# Patient Record
Sex: MALE | Race: WHITE | Employment: FULL TIME | URBAN - METROPOLITAN AREA
[De-identification: names, ages, dates, MRNs, and addresses within clinical notes are randomized per-mention and may not be internally consistent; named-entity substitution may affect disease eponyms.]

---

## 2017-04-20 ENCOUNTER — TELEPHONE (OUTPATIENT)
Dept: INTERNAL MEDICINE CLINIC | Age: 62
End: 2017-04-20

## 2017-05-16 ENCOUNTER — OFFICE VISIT (OUTPATIENT)
Dept: INTERNAL MEDICINE CLINIC | Age: 62
End: 2017-05-16
Payer: COMMERCIAL

## 2017-05-16 VITALS
SYSTOLIC BLOOD PRESSURE: 118 MMHG | HEIGHT: 71 IN | BODY MASS INDEX: 25.9 KG/M2 | DIASTOLIC BLOOD PRESSURE: 64 MMHG | WEIGHT: 185 LBS

## 2017-05-16 DIAGNOSIS — M05.79 RHEUMATOID ARTHRITIS INVOLVING MULTIPLE SITES WITH POSITIVE RHEUMATOID FACTOR (HCC): ICD-10-CM

## 2017-05-16 DIAGNOSIS — M10.9 GOUTY ARTHRITIS: ICD-10-CM

## 2017-05-16 DIAGNOSIS — I10 ESSENTIAL HYPERTENSION: ICD-10-CM

## 2017-05-16 DIAGNOSIS — I50.42 CHRONIC COMBINED SYSTOLIC AND DIASTOLIC HEART FAILURE (HCC): ICD-10-CM

## 2017-05-16 DIAGNOSIS — Z12.11 SCREEN FOR COLON CANCER: Primary | ICD-10-CM

## 2017-05-16 DIAGNOSIS — N18.6 END STAGE RENAL DISEASE (HCC): ICD-10-CM

## 2017-05-16 PROCEDURE — 99213 OFFICE O/P EST LOW 20 MIN: CPT | Performed by: INTERNAL MEDICINE

## 2017-05-16 RX ORDER — CINACALCET 30 MG/1
30 TABLET, FILM COATED ORAL
COMMUNITY

## 2017-05-16 RX ORDER — FOLIC ACID 1 MG/1
1 TABLET ORAL
COMMUNITY

## 2017-05-19 DIAGNOSIS — Z12.11 SCREEN FOR COLON CANCER: ICD-10-CM

## 2017-05-19 LAB
CONTROL: PRESENT
HEMOCCULT STL QL: NEGATIVE

## 2017-05-19 PROCEDURE — 82274 ASSAY TEST FOR BLOOD FECAL: CPT | Performed by: INTERNAL MEDICINE

## 2017-11-07 DIAGNOSIS — M10.9 GOUT, UNSPECIFIED CAUSE, UNSPECIFIED CHRONICITY, UNSPECIFIED SITE: ICD-10-CM

## 2017-11-07 RX ORDER — ALLOPURINOL 100 MG/1
TABLET ORAL
Qty: 90 TABLET | Refills: 3 | Status: SHIPPED | OUTPATIENT
Start: 2017-11-07 | End: 2018-12-05 | Stop reason: SDUPTHER

## 2017-11-14 ENCOUNTER — OFFICE VISIT (OUTPATIENT)
Dept: INTERNAL MEDICINE CLINIC | Age: 62
End: 2017-11-14
Payer: COMMERCIAL

## 2017-11-14 VITALS
SYSTOLIC BLOOD PRESSURE: 130 MMHG | HEIGHT: 71 IN | DIASTOLIC BLOOD PRESSURE: 64 MMHG | BODY MASS INDEX: 25.76 KG/M2 | WEIGHT: 184 LBS

## 2017-11-14 DIAGNOSIS — I70.1 STENOSIS OF RIGHT RENAL ARTERY (HCC): ICD-10-CM

## 2017-11-14 DIAGNOSIS — I10 ESSENTIAL HYPERTENSION: ICD-10-CM

## 2017-11-14 DIAGNOSIS — N18.6 END STAGE RENAL DISEASE (HCC): Primary | ICD-10-CM

## 2017-11-14 DIAGNOSIS — M10.9 GOUTY ARTHRITIS: ICD-10-CM

## 2017-11-14 PROCEDURE — 99213 OFFICE O/P EST LOW 20 MIN: CPT | Performed by: INTERNAL MEDICINE

## 2017-11-14 ASSESSMENT — PATIENT HEALTH QUESTIONNAIRE - PHQ9
SUM OF ALL RESPONSES TO PHQ QUESTIONS 1-9: 0
2. FEELING DOWN, DEPRESSED OR HOPELESS: 0
1. LITTLE INTEREST OR PLEASURE IN DOING THINGS: 0
SUM OF ALL RESPONSES TO PHQ9 QUESTIONS 1 & 2: 0

## 2017-11-14 NOTE — PROGRESS NOTES
Flu vaccine  Completed       HPI   Chief Complaint   Patient presents with    Hypertension     The patient reports HTN been well controlled on current medication. No complaints of dizziness, shortness of breath or chest pain. The patient does not check his BP at home on coreg       ESRD on HD doing well           Review of Systems    Past Medical History:   Diagnosis Date    Anemia in chronic kidney disease(285.21)     Atherosclerosis of renal artery (HCC)     Chronic combined systolic and diastolic heart failure (HCC)     Coronary atherosclerosis of native coronary artery     Diseases of tricuspid valve     End stage renal disease (HCC)     Gouty arthritis     Left ventricular dysfunction 8/14/2014    LVEF 36%    Mitral valve insufficiency and aortic valve insufficiency     Other and unspecified coagulation defects     Other left bundle branch block     Other primary cardiomyopathies     Psoriasis     Renal sclerosis, unspecified     Retinal hemorrhage     Rheumatoid arthritis ([address]     Stenosis of right renal artery ([address] 8/11/2014    Unspecified essential hypertension     Unspecified hypertensive kidney disease with chronic kidney disease stage V or end stage renal disease(403.91)      Social History   Substance Use Topics    Smoking status: Never Smoker    Smokeless tobacco: Never Used    Alcohol use No       ROS  CVS no chest pain no sob no orthopnea  Resp no cough   General no weight loss no fatigue no fever      Objective:   Physical Exam     Blood pressure 130/64, height 5' 10.87\" (1.8 m), weight 184 lb (83.5 kg). General Appearance NAD conversant  Neck FROM supple no JVD  Lungs normal effort Clear to auscultation  Cardio regular rhythm no murmur  Abdomen Soft nontender no HSM  Ext no edema no cyanosis no clubbing   Skin no rashes no ulcers multiple tophi   Neuro no focal deficits   Musculoskeletal no spinal tenderness no kyphosis       Assessment:      1.  End stage renal disease ([facility]     2. Gouty arthritis     3. Stenosis of right renal artery (HCC)     4.  Essential hypertension           Plan:    ESRD on HD    BP controlled    gout stable on present dose of allopurinol HD pt    cont statin ASA coreg

## 2018-05-17 ENCOUNTER — OFFICE VISIT (OUTPATIENT)
Dept: INTERNAL MEDICINE CLINIC | Age: 63
End: 2018-05-17
Payer: MEDICARE

## 2018-05-17 VITALS
SYSTOLIC BLOOD PRESSURE: 108 MMHG | DIASTOLIC BLOOD PRESSURE: 62 MMHG | BODY MASS INDEX: 26.6 KG/M2 | HEIGHT: 71 IN | WEIGHT: 190 LBS

## 2018-05-17 DIAGNOSIS — I10 ESSENTIAL HYPERTENSION: ICD-10-CM

## 2018-05-17 DIAGNOSIS — N18.6 END STAGE RENAL DISEASE (HCC): Primary | ICD-10-CM

## 2018-05-17 DIAGNOSIS — M10.9 GOUTY ARTHRITIS: ICD-10-CM

## 2018-05-17 DIAGNOSIS — I50.42 CHRONIC COMBINED SYSTOLIC AND DIASTOLIC HEART FAILURE (HCC): ICD-10-CM

## 2018-05-17 PROCEDURE — G8419 CALC BMI OUT NRM PARAM NOF/U: HCPCS | Performed by: INTERNAL MEDICINE

## 2018-05-17 PROCEDURE — G8598 ASA/ANTIPLAT THER USED: HCPCS | Performed by: INTERNAL MEDICINE

## 2018-05-17 PROCEDURE — 4040F PNEUMOC VAC/ADMIN/RCVD: CPT | Performed by: INTERNAL MEDICINE

## 2018-05-17 PROCEDURE — 99213 OFFICE O/P EST LOW 20 MIN: CPT | Performed by: INTERNAL MEDICINE

## 2018-05-17 PROCEDURE — 1036F TOBACCO NON-USER: CPT | Performed by: INTERNAL MEDICINE

## 2018-05-17 PROCEDURE — G8427 DOCREV CUR MEDS BY ELIG CLIN: HCPCS | Performed by: INTERNAL MEDICINE

## 2018-05-17 PROCEDURE — 3017F COLORECTAL CA SCREEN DOC REV: CPT | Performed by: INTERNAL MEDICINE

## 2018-05-17 PROCEDURE — 1123F ACP DISCUSS/DSCN MKR DOCD: CPT | Performed by: INTERNAL MEDICINE

## 2018-05-17 RX ORDER — PREDNISONE 20 MG/1
20 TABLET ORAL DAILY
Qty: 7 TABLET | Refills: 0 | Status: SHIPPED | OUTPATIENT
Start: 2018-05-17 | End: 2018-05-24

## 2018-05-17 ASSESSMENT — PATIENT HEALTH QUESTIONNAIRE - PHQ9
SUM OF ALL RESPONSES TO PHQ QUESTIONS 1-9: 0
1. LITTLE INTEREST OR PLEASURE IN DOING THINGS: 0
SUM OF ALL RESPONSES TO PHQ9 QUESTIONS 1 & 2: 0
2. FEELING DOWN, DEPRESSED OR HOPELESS: 0

## 2018-05-18 LAB — URIC ACID: 2.6

## 2018-05-24 DIAGNOSIS — M10.9 GOUTY ARTHRITIS: ICD-10-CM

## 2018-12-05 DIAGNOSIS — M10.9 GOUT, UNSPECIFIED CAUSE, UNSPECIFIED CHRONICITY, UNSPECIFIED SITE: ICD-10-CM

## 2018-12-05 RX ORDER — ALLOPURINOL 100 MG/1
TABLET ORAL
Qty: 90 TABLET | Refills: 3 | Status: SHIPPED | OUTPATIENT
Start: 2018-12-05

## 2019-01-24 ENCOUNTER — OFFICE VISIT (OUTPATIENT)
Dept: INTERNAL MEDICINE CLINIC | Age: 64
End: 2019-01-24
Payer: MEDICARE

## 2019-01-24 VITALS
BODY MASS INDEX: 26.88 KG/M2 | WEIGHT: 192 LBS | DIASTOLIC BLOOD PRESSURE: 68 MMHG | SYSTOLIC BLOOD PRESSURE: 110 MMHG | HEIGHT: 71 IN

## 2019-01-24 DIAGNOSIS — Z12.11 COLON CANCER SCREENING: Primary | ICD-10-CM

## 2019-01-24 DIAGNOSIS — I70.1 ATHEROSCLEROSIS OF RENAL ARTERY (HCC): ICD-10-CM

## 2019-01-24 DIAGNOSIS — I70.1 STENOSIS OF RIGHT RENAL ARTERY (HCC): ICD-10-CM

## 2019-01-24 DIAGNOSIS — N18.6 END STAGE RENAL DISEASE (HCC): ICD-10-CM

## 2019-01-24 DIAGNOSIS — I50.42 CHRONIC COMBINED SYSTOLIC AND DIASTOLIC HEART FAILURE (HCC): ICD-10-CM

## 2019-01-24 LAB
BASOPHILS ABSOLUTE: ABNORMAL /ΜL
BASOPHILS RELATIVE PERCENT: ABNORMAL %
EOSINOPHILS ABSOLUTE: ABNORMAL /ΜL
EOSINOPHILS RELATIVE PERCENT: ABNORMAL %
HCT VFR BLD CALC: 53.5 % (ref 41–53)
HEMOGLOBIN: 17.1 G/DL (ref 13.5–17.5)
LYMPHOCYTES ABSOLUTE: ABNORMAL /ΜL
LYMPHOCYTES RELATIVE PERCENT: ABNORMAL %
MCH RBC QN AUTO: 32.3 PG
MCHC RBC AUTO-ENTMCNC: 32 G/DL
MCV RBC AUTO: 100.9 FL
MONOCYTES ABSOLUTE: ABNORMAL /ΜL
MONOCYTES RELATIVE PERCENT: ABNORMAL %
NEUTROPHILS ABSOLUTE: ABNORMAL /ΜL
NEUTROPHILS RELATIVE PERCENT: ABNORMAL %
PDW BLD-RTO: ABNORMAL %
PLATELET # BLD: 182 K/ΜL
PMV BLD AUTO: ABNORMAL FL
RBC # BLD: 5.3 10^6/ΜL
WBC # BLD: 7.38 10^3/ML

## 2019-01-24 PROCEDURE — G8427 DOCREV CUR MEDS BY ELIG CLIN: HCPCS | Performed by: INTERNAL MEDICINE

## 2019-01-24 PROCEDURE — 3017F COLORECTAL CA SCREEN DOC REV: CPT | Performed by: INTERNAL MEDICINE

## 2019-01-24 PROCEDURE — G8598 ASA/ANTIPLAT THER USED: HCPCS | Performed by: INTERNAL MEDICINE

## 2019-01-24 PROCEDURE — 1123F ACP DISCUSS/DSCN MKR DOCD: CPT | Performed by: INTERNAL MEDICINE

## 2019-01-24 PROCEDURE — 99213 OFFICE O/P EST LOW 20 MIN: CPT | Performed by: INTERNAL MEDICINE

## 2019-01-24 PROCEDURE — 4040F PNEUMOC VAC/ADMIN/RCVD: CPT | Performed by: INTERNAL MEDICINE

## 2019-01-24 PROCEDURE — G8482 FLU IMMUNIZE ORDER/ADMIN: HCPCS | Performed by: INTERNAL MEDICINE

## 2019-01-24 PROCEDURE — G8419 CALC BMI OUT NRM PARAM NOF/U: HCPCS | Performed by: INTERNAL MEDICINE

## 2019-01-24 PROCEDURE — 1036F TOBACCO NON-USER: CPT | Performed by: INTERNAL MEDICINE

## 2019-01-24 PROCEDURE — 1101F PT FALLS ASSESS-DOCD LE1/YR: CPT | Performed by: INTERNAL MEDICINE

## 2019-01-24 RX ORDER — MIDODRINE HYDROCHLORIDE 10 MG/1
TABLET ORAL
COMMUNITY
Start: 2019-01-05

## 2019-01-24 RX ORDER — AMMONIUM LACTATE 12 G/100G
CREAM TOPICAL
Refills: 2 | COMMUNITY
Start: 2018-12-28 | End: 2019-01-24 | Stop reason: ALTCHOICE

## 2019-01-24 RX ORDER — METOPROLOL SUCCINATE 25 MG/1
TABLET, EXTENDED RELEASE ORAL
Refills: 3 | COMMUNITY
Start: 2018-11-27 | End: 2019-06-13 | Stop reason: SDUPTHER

## 2019-01-24 RX ORDER — CHOLECALCIFEROL (VITAMIN D3) 10 MCG
TABLET ORAL
COMMUNITY

## 2019-01-24 RX ORDER — CLOPIDOGREL BISULFATE 75 MG/1
75 TABLET ORAL
COMMUNITY

## 2019-01-24 RX ORDER — HALOBETASOL PROPIONATE 0.05 %
OINTMENT (GRAM) TOPICAL
Refills: 1 | COMMUNITY
Start: 2018-12-10 | End: 2019-01-24 | Stop reason: ALTCHOICE

## 2019-01-24 ASSESSMENT — PATIENT HEALTH QUESTIONNAIRE - PHQ9
SUM OF ALL RESPONSES TO PHQ9 QUESTIONS 1 & 2: 0
2. FEELING DOWN, DEPRESSED OR HOPELESS: 0
SUM OF ALL RESPONSES TO PHQ QUESTIONS 1-9: 0
SUM OF ALL RESPONSES TO PHQ QUESTIONS 1-9: 0
1. LITTLE INTEREST OR PLEASURE IN DOING THINGS: 0

## 2019-04-29 DIAGNOSIS — Z12.11 COLON CANCER SCREENING: ICD-10-CM

## 2019-04-30 DIAGNOSIS — R19.5 POSITIVE COLORECTAL CANCER SCREENING USING COLOGUARD TEST: Primary | ICD-10-CM

## 2019-05-30 ENCOUNTER — OFFICE VISIT (OUTPATIENT)
Dept: INTERNAL MEDICINE CLINIC | Age: 64
End: 2019-05-30
Payer: MEDICARE

## 2019-05-30 VITALS — BODY MASS INDEX: 28.14 KG/M2 | HEIGHT: 71 IN | WEIGHT: 201 LBS

## 2019-05-30 DIAGNOSIS — I10 ESSENTIAL HYPERTENSION: Primary | ICD-10-CM

## 2019-05-30 DIAGNOSIS — N18.6 END STAGE RENAL DISEASE (HCC): ICD-10-CM

## 2019-05-30 PROCEDURE — 1123F ACP DISCUSS/DSCN MKR DOCD: CPT | Performed by: INTERNAL MEDICINE

## 2019-05-30 PROCEDURE — 3017F COLORECTAL CA SCREEN DOC REV: CPT | Performed by: INTERNAL MEDICINE

## 2019-05-30 PROCEDURE — 99213 OFFICE O/P EST LOW 20 MIN: CPT | Performed by: INTERNAL MEDICINE

## 2019-05-30 PROCEDURE — G8419 CALC BMI OUT NRM PARAM NOF/U: HCPCS | Performed by: INTERNAL MEDICINE

## 2019-05-30 PROCEDURE — G8598 ASA/ANTIPLAT THER USED: HCPCS | Performed by: INTERNAL MEDICINE

## 2019-05-30 PROCEDURE — 4040F PNEUMOC VAC/ADMIN/RCVD: CPT | Performed by: INTERNAL MEDICINE

## 2019-05-30 PROCEDURE — G8428 CUR MEDS NOT DOCUMENT: HCPCS | Performed by: INTERNAL MEDICINE

## 2019-05-30 PROCEDURE — 1036F TOBACCO NON-USER: CPT | Performed by: INTERNAL MEDICINE

## 2019-05-30 NOTE — PROGRESS NOTES
Subjective:      Patient ID: Mallory Meng is a 75 y.o. male. Chronic Disease Visit Information    BP Readings from Last 3 Encounters:   01/24/19 110/68   05/17/18 108/62   11/14/17 130/64          Hemoglobin A1C (%)   Date Value   06/10/2016 5.4     LDL Cholesterol (mg/dL)   Date Value   04/12/2018 111     LDL Calculated (mg/dL)   Date Value   06/10/2016 34     HDL (mg/dL)   Date Value   04/12/2018 37     BUN (mg/dL)   Date Value   04/12/2018 41 (H)     CREATININE (mg/dL)   Date Value   04/12/2018 7.53 (H)     Glucose (mg/dL)   Date Value   04/12/2018 84            Have you changed or started any medications since your last visit including any over-the-counter medicines, vitamins, or herbal medicines? no   Are you having any side effects from any of your medications? -  no  Have you stopped taking any of your medications? Is so, why? -  no    Have you seen any other physician or provider since your last visit? No  Have you had any other diagnostic tests since your last visit? Yes - Records Obtained  Have you been seen in the emergency room and/or had an admission to a hospital since we last saw you? Yes - Records Obtained  Have you had your annual diabetic retinal (eye) exam? No  Have you had your routine dental cleaning in the past 6 months? no    Have you activated your [employer] account? If not, what are your barriers? Yes     Patient Care Team:  Jyotsna Moreno MD as PCP - General (Internal Medicine)  Jyotsna Moreno MD as PCP - [facility]  Vianey Peck MD as Consulting Physician (Cardiology)  Saundra Anderson MD as Consulting Physician (Nephrology)     Chief Complaint   Patient presents with    Hypertension    Other     pt states can not take BP. Has fistulas in both arms at the moment.               Medical History Review  Past Medical, Family, and Social History reviewed and does contribute to the patient presenting condition    Health Maintenance   Topic Date Due    Hepatitis B Vaccine (1 of 3 - Risk Recombivax 3-dose series) 02/21/1972    Annual Wellness Visit (AWV)  02/21/2016    Pneumococcal 65+ years Vaccine (2 of 2 - PPSV23) 02/21/2018    Potassium monitoring  04/12/2019    Creatinine monitoring  04/12/2019    DTaP/Tdap/Td vaccine (1 - Tdap) 01/21/2020 (Originally 2/21/1972)    Shingles Vaccine (1 of 2) 01/24/2020 (Originally 2/21/2003)    Colon cancer screen fecal DNA test (Cologuard)  04/09/2022    Lipid screen  04/12/2023    Flu vaccine  Completed    Hepatitis C screen  Completed    HPV vaccine  Aged Out       HPI  Chief Complaint   Patient presents with    Hypertension    Other     pt states can not take BP. Has fistulas in both arms at the moment.      Pt here for f/u visit    Pt is known ESRD on HD     Pt has right UE edema due to fistula clot - need procedure next week at [facility]   Has f/u with vascular     Review of Systems  Past Medical History:   Diagnosis Date    Anemia in chronic kidney disease(285.21)     Atherosclerosis of renal artery (HCC)     Chronic combined systolic and diastolic heart failure (HCC)     Coronary atherosclerosis of native coronary artery     Diseases of tricuspid valve     End stage renal disease (HCC)     Gouty arthritis     Left ventricular dysfunction 8/14/2014    LVEF 36%    Mitral valve insufficiency and aortic valve insufficiency     Other and unspecified coagulation defects     Other left bundle branch block     Other primary cardiomyopathies     Psoriasis     Renal sclerosis, unspecified     Retinal hemorrhage     Rheumatoid arthritis ([address]     Stenosis of right renal artery ([address] 8/11/2014    Unspecified essential hypertension     Unspecified hypertensive kidney disease with chronic kidney disease stage V or end stage renal disease(403.91)      Social History     Tobacco Use    Smoking status: Never Smoker    Smokeless tobacco: Never Used   Substance Use Topics    Alcohol use: No     Alcohol/week: 0.0 oz ROS  CVS no chest pain no sob no orthopnea  Resp no cough   General no weight loss no fatigue no fever      Height 5' 10.87\" (1.8 m), weight 201 lb (91.2 kg). General Appearance NAD conversant  Neck FROM supple no JVD  Lungs normal effort Clear to auscultation  Cardio regular rhythm no murmur  Abdomen Soft nontender no HSM  Ext rigth arm edema diffusely arm and forearm   Skin + tophi   Neuro no focal deficits   Musculoskeletal no spinal tenderness no kyphosis     Objective:   Physical Exam    Assessment:       Diagnosis Orders   1. Essential hypertension     2.  End stage renal disease ([facility] [address]       Pt doing well on current medications   Plan to see vascular   ESRD on HD         Plan:           Paula Mijares MD

## 2019-06-13 RX ORDER — METOPROLOL SUCCINATE 25 MG/1
50 TABLET, EXTENDED RELEASE ORAL DAILY
Qty: 60 TABLET | Refills: 3 | Status: SHIPPED | OUTPATIENT
Start: 2019-06-13 | End: 2019-07-03 | Stop reason: SDUPTHER

## 2019-06-13 NOTE — TELEPHONE ENCOUNTER
Medication: METOPROLOL   Last visit: 5/30/2019  Next visit: Visit date not found  Last refill: 11/27/2018  Pharmacy: [facility]

## 2019-06-25 ENCOUNTER — TELEPHONE (OUTPATIENT)
Dept: GASTROENTEROLOGY | Age: 64
End: 2019-06-25

## 2019-06-25 NOTE — TELEPHONE ENCOUNTER
Writer called pt and LVM to call office and schedule New Patient appt. Referral in chart. 1st attempt. New Patient Referral letter sent for 2nd attempt.

## 2019-07-03 RX ORDER — METOPROLOL SUCCINATE 25 MG/1
50 TABLET, EXTENDED RELEASE ORAL DAILY
Qty: 60 TABLET | Refills: 3 | Status: SHIPPED | OUTPATIENT
Start: 2019-07-03 | End: 2019-07-10 | Stop reason: SDUPTHER

## 2019-07-15 RX ORDER — METOPROLOL SUCCINATE 25 MG/1
50 TABLET, EXTENDED RELEASE ORAL DAILY
Qty: 180 TABLET | Refills: 3 | Status: SHIPPED | OUTPATIENT
Start: 2019-07-15 | End: 2019-10-13

## 2019-07-15 RX ORDER — METOPROLOL SUCCINATE 50 MG/1
TABLET, EXTENDED RELEASE ORAL
Qty: 21 TABLET | Refills: 5 | Status: SHIPPED | OUTPATIENT
Start: 2019-07-15 | End: 2019-10-08 | Stop reason: SDUPTHER

## 2019-08-22 ENCOUNTER — OFFICE VISIT (OUTPATIENT)
Dept: GASTROENTEROLOGY | Age: 64
End: 2019-08-22
Payer: MEDICARE

## 2019-08-22 VITALS
BODY MASS INDEX: 27.76 KG/M2 | SYSTOLIC BLOOD PRESSURE: 209 MMHG | WEIGHT: 198.3 LBS | DIASTOLIC BLOOD PRESSURE: 104 MMHG | HEART RATE: 80 BPM

## 2019-08-22 DIAGNOSIS — R19.5 POSITIVE COLORECTAL CANCER SCREENING USING COLOGUARD TEST: ICD-10-CM

## 2019-08-22 PROCEDURE — 99204 OFFICE O/P NEW MOD 45 MIN: CPT | Performed by: INTERNAL MEDICINE

## 2019-08-22 PROCEDURE — G8427 DOCREV CUR MEDS BY ELIG CLIN: HCPCS | Performed by: INTERNAL MEDICINE

## 2019-08-22 PROCEDURE — G8419 CALC BMI OUT NRM PARAM NOF/U: HCPCS | Performed by: INTERNAL MEDICINE

## 2019-08-22 RX ORDER — SEVELAMER CARBONATE 800 MG/1
5 TABLET, FILM COATED ORAL
COMMUNITY

## 2019-08-22 ASSESSMENT — ENCOUNTER SYMPTOMS
DIARRHEA: 0
VOMITING: 0
CONSTIPATION: 0
ANAL BLEEDING: 0
VOICE CHANGE: 0
NAUSEA: 0
GASTROINTESTINAL NEGATIVE: 1
COUGH: 0
ABDOMINAL DISTENTION: 0
TROUBLE SWALLOWING: 0
RECTAL PAIN: 0
WHEEZING: 0
RESPIRATORY NEGATIVE: 1
ALLERGIC/IMMUNOLOGIC NEGATIVE: 1
ABDOMINAL PAIN: 0
BACK PAIN: 0
SINUS PRESSURE: 0
BLOOD IN STOOL: 0
CHOKING: 0
SORE THROAT: 0

## 2019-08-22 NOTE — PROGRESS NOTES
Subjective:      Patient ID: Israel Turner is a 75 y.o. male. Dr. Chip Medina MD has requested that I see Israel Turner for a consult for   1. Positive colorectal cancer screening using Cologuard test     .    Past Medical, Family, and Social History reviewed and does contribute to the patient presenting condition. patient\"s PMH/PSH,SH,PSYCH hx, MEDs, ALLERGIES, and ROS was all reviewed and updated ion the appropriate sections      HPI  Patient seen with  positive Cologuard test.      On further discussion patient denies hematochezia. No melena. Does not strain at bowel movements. No constipation or diarrhea. He has good appetite. No dysphagia or dyspepsia. No heartburns. No prior history of known liver disease. He is not an alcoholic. Denies taking anticoagulation therapy. However he is on antiplatelet therapy. He had a open heart surgery done in 2015. He has end-stage renal disease and he is on hemodialysis. His hemoglobin has been stable at about 17 g. He never had colonoscopy in the past.          Review of Systems   Constitutional: Negative. Negative for appetite change, fatigue and unexpected weight change. HENT: Negative. Negative for dental problem, postnasal drip, sinus pressure, sore throat, trouble swallowing and voice change. Eyes: Positive for visual disturbance (glasses). Respiratory: Negative. Negative for cough, choking and wheezing. Cardiovascular: Negative. Negative for chest pain, palpitations and leg swelling. Gastrointestinal: Negative. Negative for abdominal distention, abdominal pain, anal bleeding, blood in stool, constipation, diarrhea, nausea, rectal pain and vomiting. Genitourinary: Negative. Negative for difficulty urinating. Musculoskeletal: Positive for arthralgias. Negative for back pain, gait problem and myalgias. Allergic/Immunologic: Negative. Negative for environmental allergies and food allergies. Neurological: Negative. preparation, risks and benefits. After discussion he understood and agreed. This is arranged. GoLYTELY preparation may be appropriate for this patient. Also he may need back sedation.

## 2019-08-23 ENCOUNTER — TELEPHONE (OUTPATIENT)
Dept: GASTROENTEROLOGY | Age: 64
End: 2019-08-23

## 2019-10-04 ENCOUNTER — TELEPHONE (OUTPATIENT)
Dept: GASTROENTEROLOGY | Age: 64
End: 2019-10-04

## 2019-10-08 ENCOUNTER — TELEPHONE (OUTPATIENT)
Dept: GASTROENTEROLOGY | Age: 64
End: 2019-10-08

## 2019-10-11 ENCOUNTER — TELEPHONE (OUTPATIENT)
Dept: GASTROENTEROLOGY | Age: 64
End: 2019-10-11

## 2019-10-11 RX ORDER — METOPROLOL SUCCINATE 50 MG/1
TABLET, EXTENDED RELEASE ORAL
Qty: 90 TABLET | Refills: 1 | Status: SHIPPED | OUTPATIENT
Start: 2019-10-11

## 2019-11-14 ENCOUNTER — TELEPHONE (OUTPATIENT)
Dept: INTERNAL MEDICINE CLINIC | Age: 64
End: 2019-11-14

## 2019-11-18 ENCOUNTER — TELEPHONE (OUTPATIENT)
Dept: INTERNAL MEDICINE CLINIC | Age: 64
End: 2019-11-18

## 2019-11-26 ENCOUNTER — TELEPHONE (OUTPATIENT)
Dept: GASTROENTEROLOGY | Age: 64
End: 2019-11-26

## 2019-12-10 ENCOUNTER — OFFICE VISIT (OUTPATIENT)
Dept: GASTROENTEROLOGY | Age: 64
End: 2019-12-10
Payer: MEDICARE

## 2019-12-10 VITALS
DIASTOLIC BLOOD PRESSURE: 85 MMHG | SYSTOLIC BLOOD PRESSURE: 150 MMHG | BODY MASS INDEX: 26.52 KG/M2 | HEART RATE: 68 BPM | WEIGHT: 189.4 LBS

## 2019-12-10 DIAGNOSIS — R19.5 POSITIVE COLORECTAL CANCER SCREENING USING COLOGUARD TEST: Primary | ICD-10-CM

## 2019-12-10 PROCEDURE — 4040F PNEUMOC VAC/ADMIN/RCVD: CPT | Performed by: INTERNAL MEDICINE

## 2019-12-10 PROCEDURE — 99213 OFFICE O/P EST LOW 20 MIN: CPT | Performed by: INTERNAL MEDICINE

## 2019-12-10 PROCEDURE — 1123F ACP DISCUSS/DSCN MKR DOCD: CPT | Performed by: INTERNAL MEDICINE

## 2019-12-10 PROCEDURE — G8484 FLU IMMUNIZE NO ADMIN: HCPCS | Performed by: INTERNAL MEDICINE

## 2019-12-10 PROCEDURE — G8427 DOCREV CUR MEDS BY ELIG CLIN: HCPCS | Performed by: INTERNAL MEDICINE

## 2019-12-10 PROCEDURE — 3017F COLORECTAL CA SCREEN DOC REV: CPT | Performed by: INTERNAL MEDICINE

## 2019-12-10 PROCEDURE — G8417 CALC BMI ABV UP PARAM F/U: HCPCS | Performed by: INTERNAL MEDICINE

## 2019-12-10 PROCEDURE — 1036F TOBACCO NON-USER: CPT | Performed by: INTERNAL MEDICINE

## 2019-12-10 PROCEDURE — G8598 ASA/ANTIPLAT THER USED: HCPCS | Performed by: INTERNAL MEDICINE

## 2019-12-10 ASSESSMENT — ENCOUNTER SYMPTOMS
NAUSEA: 0
ANAL BLEEDING: 0
CONSTIPATION: 0
DIARRHEA: 0
SINUS PRESSURE: 0
BLOOD IN STOOL: 0
VOMITING: 0
COUGH: 0
RECTAL PAIN: 0
BACK PAIN: 0
ABDOMINAL DISTENTION: 0
WHEEZING: 0
ABDOMINAL PAIN: 0
SORE THROAT: 0
CHOKING: 0
TROUBLE SWALLOWING: 0

## 2019-12-11 ENCOUNTER — TELEPHONE (OUTPATIENT)
Dept: GASTROENTEROLOGY | Age: 64
End: 2019-12-11

## 2019-12-23 ENCOUNTER — TELEPHONE (OUTPATIENT)
Dept: GASTROENTEROLOGY | Age: 64
End: 2019-12-23

## 2020-01-08 ENCOUNTER — HOSPITAL ENCOUNTER (OUTPATIENT)
Age: 65
Setting detail: SPECIMEN
Discharge: HOME | End: 2020-01-08
Payer: MEDICARE

## 2020-01-08 LAB — POTASSIUM SERPL-SCNC: 4.5 MMOL/L (ref 3.7–5.3)

## 2020-01-08 PROCEDURE — 84132 ASSAY OF SERUM POTASSIUM: CPT

## 2020-01-08 PROCEDURE — 36415 COLL VENOUS BLD VENIPUNCTURE: CPT

## 2020-01-17 ENCOUNTER — TELEPHONE (OUTPATIENT)
Dept: GASTROENTEROLOGY | Age: 65
End: 2020-01-17

## 2020-01-17 NOTE — TELEPHONE ENCOUNTER
Received call from Jeanette Lambert at [facility] to discuss medical clearance for patient colon scheduled Monday 1/20/20. Jeanette Lambert did not received signed medical clearance from Dr. Bacilio Bethea so writer sent again via Epic. Per Jeanette Lambert, anesthesia may require a new potassium level to be drawn and there were notes left for her requesting patient be seen by Dr. Bacilio Bethea on 1/20/20 which is the same day of procedure. Writer verified that no new lab results are available for patient. Zonia to work on this and will call if there is an issue with patient proceeding with procedure.

## 2020-03-02 ENCOUNTER — TELEPHONE (OUTPATIENT)
Dept: INFECTIOUS DISEASES | Age: 65
End: 2020-03-02

## 2020-03-02 NOTE — TELEPHONE ENCOUNTER
----- Message from Ivana Michael sent at 3/2/2020 10:50 AM EST -----  Sara Mckeon, per summary of care patient to follow up with Dr Rashaad Calderon. Please call to schedule. Thank you.

## 2020-03-20 PROBLEM — A41.9 LINE SEPSIS ASSOCIATED WITH DIALYSIS CATHETER (HCC): Status: ACTIVE | Noted: 2019-10-07

## 2020-03-20 PROBLEM — R55 SYNCOPE: Status: ACTIVE | Noted: 2019-06-06

## 2020-03-20 PROBLEM — Z99.2 DEPENDENCE ON RENAL DIALYSIS (HCC): Status: ACTIVE | Noted: 2017-01-18

## 2020-03-20 PROBLEM — M1A.39X1 CHRONIC TOPHACEOUS GOUT OF MULTIPLE SITES DUE TO RENAL IMPAIRMENT: Status: ACTIVE | Noted: 2017-06-29

## 2020-03-20 PROBLEM — T82.7XXA LINE SEPSIS ASSOCIATED WITH DIALYSIS CATHETER (HCC): Status: ACTIVE | Noted: 2019-10-07

## 2020-03-20 PROBLEM — R94.39 CARDIOVASCULAR STRESS TEST ABNORMAL: Status: ACTIVE | Noted: 2020-03-20

## 2020-03-20 PROBLEM — E87.5 HYPERKALEMIA: Status: ACTIVE | Noted: 2017-01-18

## 2020-03-20 PROBLEM — I87.1 STENOSIS OF LEFT SUBCLAVIAN VEIN: Status: ACTIVE | Noted: 2018-01-30

## 2020-03-20 PROBLEM — M48.02 DEGENERATIVE CERVICAL SPINAL STENOSIS: Status: ACTIVE | Noted: 2020-03-20

## 2020-03-20 PROBLEM — E78.5 HYPERLIPIDEMIA: Status: ACTIVE | Noted: 2020-03-20

## 2020-03-20 PROBLEM — G95.20 SPINAL CORD COMPRESSION (HCC): Status: ACTIVE | Noted: 2020-03-20

## 2020-03-20 PROBLEM — M10.9 GOUTY ARTHROPATHY: Status: ACTIVE | Noted: 2020-03-20

## 2020-03-20 PROBLEM — R94.31 ELECTROCARDIOGRAM ABNORMAL: Status: ACTIVE | Noted: 2020-03-20

## 2020-08-13 ENCOUNTER — HOSPITAL ENCOUNTER (OUTPATIENT)
Age: 65
Setting detail: SPECIMEN
Discharge: HOME | End: 2020-08-13
Payer: MEDICARE

## 2020-08-13 LAB — POTASSIUM SERPL-SCNC: 4.5 MMOL/L (ref 3.7–5.3)

## 2020-11-06 ENCOUNTER — HOSPITAL ENCOUNTER (OUTPATIENT)
Age: 65
Setting detail: SPECIMEN
Discharge: HOME | End: 2020-11-06
Payer: MEDICARE

## 2020-11-06 LAB
VALPROIC ACID LEVEL: <3 UG/ML (ref 50–125)
VALPROIC DATE LAST DOSE: ABNORMAL
VALPROIC DOSE AMOUNT: ABNORMAL
VALPROIC TIME LAST DOSE: ABNORMAL

## 2020-11-08 LAB
VANCOMYCIN RANDOM DATE LAST DOSE: NORMAL
VANCOMYCIN RANDOM DOSE AMOUNT: NORMAL
VANCOMYCIN RANDOM TIME LAST DOSE: NORMAL
VANCOMYCIN RANDOM: 12.8 UG/ML

## 2021-03-01 ENCOUNTER — HOSPITAL ENCOUNTER (OUTPATIENT)
Age: 66
Setting detail: SPECIMEN
Discharge: HOME | End: 2021-03-01
Payer: MEDICARE

## 2021-03-01 LAB
VANCOMYCIN RANDOM DATE LAST DOSE: NORMAL
VANCOMYCIN RANDOM DOSE AMOUNT: NORMAL
VANCOMYCIN RANDOM TIME LAST DOSE: NORMAL
VANCOMYCIN RANDOM: 23.3 UG/ML

## 2021-03-01 PROCEDURE — 36415 COLL VENOUS BLD VENIPUNCTURE: CPT

## 2021-03-01 PROCEDURE — 80202 ASSAY OF VANCOMYCIN: CPT

## 2021-04-10 ENCOUNTER — APPOINTMENT (OUTPATIENT)
Dept: GENERAL RADIOLOGY | Age: 66
End: 2021-04-10
Payer: MEDICARE

## 2021-04-10 ENCOUNTER — HOSPITAL ENCOUNTER (EMERGENCY)
Age: 66
Discharge: ANOTHER HOSPITAL | End: 2021-04-10
Attending: EMERGENCY MEDICINE
Payer: MEDICARE

## 2021-04-10 ENCOUNTER — APPOINTMENT (OUTPATIENT)
Dept: NUCLEAR MEDICINE | Age: 66
End: 2021-04-10
Payer: MEDICARE

## 2021-04-10 VITALS
HEIGHT: 69 IN | WEIGHT: 177 LBS | DIASTOLIC BLOOD PRESSURE: 66 MMHG | TEMPERATURE: 97.9 F | RESPIRATION RATE: 12 BRPM | SYSTOLIC BLOOD PRESSURE: 90 MMHG | HEART RATE: 73 BPM | OXYGEN SATURATION: 97 % | BODY MASS INDEX: 26.22 KG/M2

## 2021-04-10 DIAGNOSIS — R09.02 HYPOXIA: Primary | ICD-10-CM

## 2021-04-10 LAB
ABSOLUTE EOS #: 0.2 K/UL (ref 0–0.4)
ABSOLUTE IMMATURE GRANULOCYTE: ABNORMAL K/UL (ref 0–0.3)
ABSOLUTE LYMPH #: 1 K/UL (ref 1–4.8)
ABSOLUTE MONO #: 1 K/UL (ref 0.1–1.3)
ALBUMIN SERPL-MCNC: 4.2 G/DL (ref 3.5–5.2)
ALBUMIN/GLOBULIN RATIO: ABNORMAL (ref 1–2.5)
ALP BLD-CCNC: 143 U/L (ref 40–129)
ALT SERPL-CCNC: 6 U/L (ref 5–41)
ANION GAP SERPL CALCULATED.3IONS-SCNC: 17 MMOL/L (ref 9–17)
ANTI-XA UNFRAC HEPARIN: >1.1 IU/L (ref 0.3–0.7)
AST SERPL-CCNC: 19 U/L
BASOPHILS # BLD: 1 % (ref 0–2)
BASOPHILS ABSOLUTE: 0.1 K/UL (ref 0–0.2)
BILIRUB SERPL-MCNC: 1.48 MG/DL (ref 0.3–1.2)
BNP INTERPRETATION: ABNORMAL
BUN BLDV-MCNC: 31 MG/DL (ref 8–23)
BUN/CREAT BLD: ABNORMAL (ref 9–20)
C-REACTIVE PROTEIN: 313.4 MG/L (ref 0–5)
CALCIUM SERPL-MCNC: 10.5 MG/DL (ref 8.6–10.4)
CHLORIDE BLD-SCNC: 94 MMOL/L (ref 98–107)
CO2: 22 MMOL/L (ref 20–31)
CREAT SERPL-MCNC: 7.14 MG/DL (ref 0.7–1.2)
D-DIMER QUANTITATIVE: 4.4 MG/L FEU (ref 0–0.59)
DIFFERENTIAL TYPE: ABNORMAL
EOSINOPHILS RELATIVE PERCENT: 3 % (ref 0–4)
GFR AFRICAN AMERICAN: 9 ML/MIN
GFR NON-AFRICAN AMERICAN: 8 ML/MIN
GFR SERPL CREATININE-BSD FRML MDRD: ABNORMAL ML/MIN/{1.73_M2}
GFR SERPL CREATININE-BSD FRML MDRD: ABNORMAL ML/MIN/{1.73_M2}
GLUCOSE BLD-MCNC: 102 MG/DL (ref 70–99)
HCT VFR BLD CALC: 48.5 % (ref 41–53)
HEMOGLOBIN: 15.2 G/DL (ref 13.5–17.5)
IMMATURE GRANULOCYTES: ABNORMAL %
INFLUENZA A: NEGATIVE
INFLUENZA B: NEGATIVE
INR BLD: 1.2
LYMPHOCYTES # BLD: 13 % (ref 24–44)
MCH RBC QN AUTO: 31.2 PG (ref 26–34)
MCHC RBC AUTO-ENTMCNC: 31.4 G/DL (ref 31–37)
MCV RBC AUTO: 99.6 FL (ref 80–100)
MONOCYTES # BLD: 14 % (ref 1–7)
NRBC AUTOMATED: ABNORMAL PER 100 WBC
PARTIAL THROMBOPLASTIN TIME: 31.3 SEC (ref 24–36)
PDW BLD-RTO: 19.4 % (ref 11.5–14.9)
PLATELET # BLD: 25 K/UL (ref 150–450)
PLATELET ESTIMATE: ABNORMAL
PMV BLD AUTO: 9.3 FL (ref 6–12)
POTASSIUM SERPL-SCNC: 5.4 MMOL/L (ref 3.7–5.3)
PRO-BNP: ABNORMAL PG/ML
PROTHROMBIN TIME: 14.8 SEC (ref 11.8–14.6)
RBC # BLD: 4.88 M/UL (ref 4.5–5.9)
RBC # BLD: ABNORMAL 10*6/UL
SARS-COV-2 RNA, RT PCR: NOT DETECTED
SARS-COV-2, RAPID: NOT DETECTED
SEDIMENTATION RATE, ERYTHROCYTE: 18 MM (ref 0–20)
SEG NEUTROPHILS: 69 % (ref 36–66)
SEGMENTED NEUTROPHILS ABSOLUTE COUNT: 5 K/UL (ref 1.3–9.1)
SODIUM BLD-SCNC: 133 MMOL/L (ref 135–144)
SOURCE: NORMAL
SPECIMEN DESCRIPTION: NORMAL
SPECIMEN DESCRIPTION: NORMAL
TOTAL PROTEIN: 7.1 G/DL (ref 6.4–8.3)
TROPONIN INTERP: ABNORMAL
TROPONIN INTERP: ABNORMAL
TROPONIN T: ABNORMAL NG/ML
TROPONIN T: ABNORMAL NG/ML
TROPONIN, HIGH SENSITIVITY: 135 NG/L (ref 0–22)
TROPONIN, HIGH SENSITIVITY: 137 NG/L (ref 0–22)
WBC # BLD: 7.2 K/UL (ref 3.5–11)
WBC # BLD: ABNORMAL 10*3/UL

## 2021-04-10 PROCEDURE — 80053 COMPREHEN METABOLIC PANEL: CPT

## 2021-04-10 PROCEDURE — 85025 COMPLETE CBC W/AUTO DIFF WBC: CPT

## 2021-04-10 PROCEDURE — 71045 X-RAY EXAM CHEST 1 VIEW: CPT

## 2021-04-10 PROCEDURE — 86140 C-REACTIVE PROTEIN: CPT

## 2021-04-10 PROCEDURE — 83880 ASSAY OF NATRIURETIC PEPTIDE: CPT

## 2021-04-10 PROCEDURE — 3430000000 HC RX DIAGNOSTIC RADIOPHARMACEUTICAL: Performed by: EMERGENCY MEDICINE

## 2021-04-10 PROCEDURE — 87636 SARSCOV2 & INF A&B AMP PRB: CPT

## 2021-04-10 PROCEDURE — 85610 PROTHROMBIN TIME: CPT

## 2021-04-10 PROCEDURE — 84484 ASSAY OF TROPONIN QUANT: CPT

## 2021-04-10 PROCEDURE — 85379 FIBRIN DEGRADATION QUANT: CPT

## 2021-04-10 PROCEDURE — 6360000002 HC RX W HCPCS: Performed by: EMERGENCY MEDICINE

## 2021-04-10 PROCEDURE — 99285 EMERGENCY DEPT VISIT HI MDM: CPT

## 2021-04-10 PROCEDURE — 85730 THROMBOPLASTIN TIME PARTIAL: CPT

## 2021-04-10 PROCEDURE — A9540 TC99M MAA: HCPCS | Performed by: EMERGENCY MEDICINE

## 2021-04-10 PROCEDURE — 87635 SARS-COV-2 COVID-19 AMP PRB: CPT

## 2021-04-10 PROCEDURE — 96375 TX/PRO/DX INJ NEW DRUG ADDON: CPT

## 2021-04-10 PROCEDURE — 85652 RBC SED RATE AUTOMATED: CPT

## 2021-04-10 PROCEDURE — 2580000003 HC RX 258: Performed by: EMERGENCY MEDICINE

## 2021-04-10 PROCEDURE — 85520 HEPARIN ASSAY: CPT

## 2021-04-10 PROCEDURE — 96374 THER/PROPH/DIAG INJ IV PUSH: CPT

## 2021-04-10 PROCEDURE — 93005 ELECTROCARDIOGRAM TRACING: CPT | Performed by: EMERGENCY MEDICINE

## 2021-04-10 PROCEDURE — 36415 COLL VENOUS BLD VENIPUNCTURE: CPT

## 2021-04-10 PROCEDURE — 78580 LUNG PERFUSION IMAGING: CPT

## 2021-04-10 RX ORDER — HEPARIN SODIUM 10000 [USP'U]/100ML
18 INJECTION, SOLUTION INTRAVENOUS CONTINUOUS
Status: DISCONTINUED | OUTPATIENT
Start: 2021-04-10 | End: 2021-04-11 | Stop reason: HOSPADM

## 2021-04-10 RX ORDER — SODIUM CHLORIDE 9 MG/ML
1000 INJECTION, SOLUTION INTRAVENOUS CONTINUOUS
Status: DISCONTINUED | OUTPATIENT
Start: 2021-04-10 | End: 2021-04-11 | Stop reason: HOSPADM

## 2021-04-10 RX ORDER — ONDANSETRON 2 MG/ML
4 INJECTION INTRAMUSCULAR; INTRAVENOUS ONCE
Status: COMPLETED | OUTPATIENT
Start: 2021-04-10 | End: 2021-04-10

## 2021-04-10 RX ORDER — SODIUM CHLORIDE 0.9 % (FLUSH) 0.9 %
10 SYRINGE (ML) INJECTION PRN
Status: DISCONTINUED | OUTPATIENT
Start: 2021-04-10 | End: 2021-04-11 | Stop reason: HOSPADM

## 2021-04-10 RX ORDER — HEPARIN SODIUM 10000 [USP'U]/100ML
18 INJECTION, SOLUTION INTRAVENOUS CONTINUOUS
Status: DISCONTINUED | OUTPATIENT
Start: 2021-04-10 | End: 2021-04-10

## 2021-04-10 RX ORDER — HEPARIN SODIUM 1000 [USP'U]/ML
80 INJECTION, SOLUTION INTRAVENOUS; SUBCUTANEOUS ONCE
Status: COMPLETED | OUTPATIENT
Start: 2021-04-10 | End: 2021-04-10

## 2021-04-10 RX ORDER — HEPARIN SODIUM 1000 [USP'U]/ML
40 INJECTION, SOLUTION INTRAVENOUS; SUBCUTANEOUS PRN
Status: DISCONTINUED | OUTPATIENT
Start: 2021-04-10 | End: 2021-04-11 | Stop reason: HOSPADM

## 2021-04-10 RX ORDER — HEPARIN SODIUM 1000 [USP'U]/ML
80 INJECTION, SOLUTION INTRAVENOUS; SUBCUTANEOUS PRN
Status: DISCONTINUED | OUTPATIENT
Start: 2021-04-10 | End: 2021-04-11 | Stop reason: HOSPADM

## 2021-04-10 RX ADMIN — SODIUM CHLORIDE 1000 ML: 9 INJECTION, SOLUTION INTRAVENOUS at 16:26

## 2021-04-10 RX ADMIN — ONDANSETRON 4 MG: 2 INJECTION INTRAMUSCULAR; INTRAVENOUS at 20:02

## 2021-04-10 RX ADMIN — HEPARIN SODIUM AND DEXTROSE 18 UNITS/KG/HR: 10000; 5 INJECTION INTRAVENOUS at 18:57

## 2021-04-10 RX ADMIN — Medication 10 ML: at 20:19

## 2021-04-10 RX ADMIN — Medication 10 ML: at 20:18

## 2021-04-10 RX ADMIN — HEPARIN SODIUM 6420 UNITS: 1000 INJECTION INTRAVENOUS; SUBCUTANEOUS at 18:54

## 2021-04-10 RX ADMIN — Medication 9.3 MILLICURIE: at 20:18

## 2021-04-10 ASSESSMENT — ENCOUNTER SYMPTOMS
SHORTNESS OF BREATH: 1
COUGH: 1
COLOR CHANGE: 0
ABDOMINAL PAIN: 0
EYE PAIN: 0
BACK PAIN: 0

## 2021-04-10 NOTE — ED TRIAGE NOTES
Patient to ED from home with onset of chest pain that began yesterday after his dialysis treatment. Described as midsternal burning. Also c/o cough for past 2 weeks. Is wearing oxygen from EMS at 2L, SP02 90-92%. Feels SOB. Denies home oxygen. Denies covid vaccinations. Fingertips blue in color, skin overall cool to touch. Unable to obtain oral temperature.

## 2021-04-10 NOTE — ED NOTES
Bed: 10  Expected date:   Expected time:   Means of arrival:   Comments:  Chest pain      Margie Ernandez RN  04/10/21 4791

## 2021-04-10 NOTE — ED PROVIDER NOTES
EMERGENCY DEPARTMENT ENCOUNTER    Pt Name: Yousif Madrigal  MRN: [id number]  [place] 1955  Date of evaluation: 4/10/21  CHIEF COMPLAINT       Chief Complaint   Patient presents with    Chest Pain    Cough    Shortness of Breath     HISTORY OF PRESENT ILLNESS   68-year-old male with a history of end-stage renal disease on hemodialysis Monday Wednesday Friday presents for complaint of cough chest pain and shortness of breath. Patient states he been having cough and shortness of breath for the last week, states that he got progressively worse this morning, states that the shortness of breath is worse with exertion, states he is also having some chest pain this morning as well, and called squad, patient did take 4 aspirin before arrival, on EMS arrival patient was found to be hypoxic into the mid to high 80s, was placed on supplemental O2 with improvement of symptoms, patient states that his cough has been dry, denies any fevers at home, states he has been feeling increasingly fatigued. Denies any significant swelling in his abdomen or legs, states he did go to dialysis yesterday and had a full run of dialysis. Denies getting any of his covid vaccinations at this time. The history is provided by the patient. REVIEW OF SYSTEMS     Review of Systems   Constitutional: Positive for fatigue. Negative for chills and fever. HENT: Negative for congestion and ear pain. Eyes: Negative for pain. Respiratory: Positive for cough and shortness of breath. Cardiovascular: Positive for chest pain. Negative for palpitations and leg swelling. Gastrointestinal: Negative for abdominal pain. Genitourinary: Negative for dysuria and flank pain. Musculoskeletal: Negative for back pain. Skin: Negative for color change. Neurological: Negative for numbness and headaches. Psychiatric/Behavioral: Negative for confusion. All other systems reviewed and are negative.     PASTMEDICAL HISTORY     Past Medical right renal artery (HCC) I70.1    Mitral valve insufficiency and aortic valve insufficiency I08.0    Retinal hemorrhage H35.60    Chronic combined systolic and diastolic heart failure ([facility]) I50.42    End stage renal disease ([facility]) N18.6    Essential hypertension I10    Coronary atherosclerosis of native coronary artery I25.10    Disease of tricuspid valve I07.9    Other left bundle branch block I44.7    Renal sclerosis N26.9    Atherosclerosis of renal artery ([facility]) I70.1    Anemia in chronic renal disease N18.9, D63.1    Positive colorectal cancer screening using Cologuard test R19.5    Cardiovascular stress test abnormal R94.39    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    Syncope R55     SURGICAL HISTORY       Past Surgical History:   Procedure Laterality Date    CERVICAL LAMINECTOMY       CURRENT MEDICATIONS       Discharge Medication List as of 4/11/2021  3:53 AM      CONTINUE these medications which have NOT CHANGED    Details   metoprolol succinate (TOPROL XL) 50 MG extended release tablet TAKE 1 TABLET (50 MG TOTAL) BY MOUTH DAILY, Disp-90 tablet, R-1Normal      sevelamer (RENVELA) 800 MG tablet Take 5 tablets by mouth 3 times daily (with meals)Historical Med      clopidogrel (PLAVIX) 75 MG tablet Take 75 mg by mouthHistorical Med      midodrine (PROAMATINE) 10 MG tablet TAKE 1 TABLET BY MOUTH THREE TIMES A DAY AS NEEDED IF SYSTOLIC BP <90.Historical Med      b complex-C-folic acid (NEPHROCAPS) 1 MG capsule Nephrocaps   1 cap qdHistorical Med      allopurinol (ZYLOPRIM) 100 MG tablet TAKE 1 TABLET DAILY, Disp-90 tablet, R-3Normal      cinacalcet (SENSIPAR) 30 MG tablet Take 30 mg by mouthHistorical Med      Ferric Citrate (AURYXIA) 1  MG(FE) TABS TAKE 2 TABLETS BY MOUTH 3 TIMES A DAY WITH MEALSHistorical Med      folic acid (FOLVITE) 1 MG tablet Take 1 mg by mouthHistorical Med      atorvastatin (LIPITOR) 40 MG tablet Take 40 mg by mouth      aspirin 81 MG tablet Take 81 mg by mouth daily           ALLERGIES     is allergic to adhesive tape; povidone iodine; and povidone-iodine. FAMILY HISTORY     has no family status information on file. SOCIAL HISTORY       Social History     Tobacco Use    Smoking status: Never Smoker    Smokeless tobacco: Never Used   Substance Use Topics    Alcohol use: No     Alcohol/week: 0.0 standard drinks    Drug use: No     PHYSICAL EXAM     INITIAL VITALS: BP 90/66   Pulse 73   Temp 97.9 °F (36.6 °C) (Oral)   Resp 12   Ht 5' 9\" (1.753 m)   Wt 177 lb (80.3 kg)   SpO2 97%   BMI 26.14 kg/m²    Physical Exam  Vitals signs and nursing note reviewed. Constitutional:       Appearance: Normal appearance. HENT:      Head: Normocephalic and atraumatic. Right Ear: External ear normal.      Left Ear: External ear normal.      Nose: Nose normal.      Mouth/Throat:      Mouth: Mucous membranes are moist.   Eyes:      Pupils: Pupils are equal, round, and reactive to light. Neck:      Musculoskeletal: Neck supple. Cardiovascular:      Rate and Rhythm: Normal rate and regular rhythm. Pulses: Normal pulses. Heart sounds: Normal heart sounds. Arteriovenous access: right arteriovenous access is present. Comments: Right femoral dialysis catheter  Pulmonary:      Effort: Pulmonary effort is normal.      Breath sounds: Normal breath sounds. Abdominal:      General: Abdomen is flat. Palpations: Abdomen is soft. Tenderness: There is no abdominal tenderness. Musculoskeletal: Normal range of motion. General: No tenderness. Right lower leg: No edema. Left lower leg: No edema. Skin:     General: Skin is warm and dry. Capillary Refill: Capillary refill takes less than 2 seconds. Neurological:      General: No focal deficit present. Mental Status: He is alert and oriented to person, place, and time.    Psychiatric:         Behavior: Behavior normal.         MEDICAL DECISION MAKIN-year-old male, on initial exam patient in no acute distress, was reportedly hypoxic on EMS arrival, on 2 L nasal cannula satting well, will obtain labs chest x-ray, concern for Covid    Labs were reviewed patient was noted to have elevated D-dimer CRP and ESR, troponins were also noted to be elevated, likely secondary to his end-stage renal disease, creatinine was elevated and potassium was mildly elevated at 5.4, also consistent with his end-stage renal disease, no EKG changes were noted    Chest x-ray was negative, given need for O2 and elevated D-dimer, will obtain VQ scan as patient has a history of renal disease    Given that patient ended with new hypoxia, Covid was negative with clear chest x-ray, concern for possible PE, patient was started on high-dose heparin    Did discuss with patient's nephrologist Dr. Jose Rafael Sotddard, ports that this is a normal potassium for him, no need for further treatment, will evaluate patient    Also discussed with patient's cardiologist Dr. Amara Pierce, who did reviewed the patient's EKGs, no significant concern for cardiac ischemia, does agree with heparin drip, will evaluate patient    Results were discussed with the patient, he is agreeable to treatment, patient does report that his insurance precludes him from being admitted to a [facility] facility and is requesting transfer to a premedical facility, will facilitate transfer    Discussed with Dr. Shin Gifford, at [facility], will accept transfer    Patient was transferred to [facility], vital signs were stable at time of transfer, patient was in no respiratory distress       CRITICAL CARE: 33 min      PROCEDURES:    Procedures    DIAGNOSTIC RESULTS   EKG:All EKG's are interpreted by the Emergency Department Physician who either signs or Co-signs this chart in the absence of a cardiologist.    Sinus rhythm rate of 93, left axis, left bundle branch block    RADIOLOGY:All plain film, CT, MRI, and formal ultrasound images (except ED bedside ultrasound) are read by the radiologist, see reports below, unless otherwisenoted in MDM or here. NM LUNG SCAN PERFUSION ONLY   Final Result   Multiple bilateral segmental filling defects. High probability for PE.         XR CHEST PORTABLE   Final Result   No acute cardiopulmonary disease. XR CHEST PORTABLE   Final Result   Postsurgical changes. Large-bore catheter in situ. No cardiomegaly or acute   pulmonary findings. LABS: All lab results were reviewed by myself, and all abnormals are listed below.   Labs Reviewed   CBC WITH AUTO DIFFERENTIAL - Abnormal; Notable for the following components:       Result Value    RDW 19.4 (*)     Platelets 25 (*)     Seg Neutrophils 69 (*)     Lymphocytes 13 (*)     Monocytes 14 (*)     All other components within normal limits   COMPREHENSIVE METABOLIC PANEL W/ REFLEX TO MG FOR LOW K - Abnormal; Notable for the following components:    Glucose 102 (*)     BUN 31 (*)     CREATININE 7.14 (*)     Calcium 10.5 (*)     Sodium 133 (*)     Potassium 5.4 (*)     Chloride 94 (*)     Alkaline Phosphatase 143 (*)     Total Bilirubin 1.48 (*)     GFR Non- 8 (*)     GFR  9 (*)     All other components within normal limits   TROPONIN - Abnormal; Notable for the following components:    Troponin, High Sensitivity 137 (*)     All other components within normal limits   BRAIN NATRIURETIC PEPTIDE - Abnormal; DISCONTD: sodium chloride flush 0.9 % injection 10 mL     CONSULTS:  IP CONSULT TO NEPHROLOGY  IP CONSULT TO CARDIOLOGY    FINAL IMPRESSION      1. Hypoxia          DISPOSITION/PLAN   DISPOSITION Decision To Transfer 04/10/2021 04:23:58 PM      PATIENT REFERRED TO:  No follow-up provider specified.   DISCHARGE MEDICATIONS:  Discharge Medication List as of 4/11/2021  3:53 AM        Andi Kimble DO  Attending Emergency Physician                  Andi Kimble DO  04/11/21 1211

## 2021-04-11 LAB
EKG ATRIAL RATE: 93 BPM
EKG P AXIS: 99 DEGREES
EKG P-R INTERVAL: 198 MS
EKG Q-T INTERVAL: 428 MS
EKG QRS DURATION: 164 MS
EKG QTC CALCULATION (BAZETT): 532 MS
EKG R AXIS: -56 DEGREES
EKG T AXIS: 136 DEGREES
EKG VENTRICULAR RATE: 93 BPM

## 2021-04-11 PROCEDURE — 93010 ELECTROCARDIOGRAM REPORT: CPT | Performed by: INTERNAL MEDICINE

## 2021-04-12 LAB
EKG ATRIAL RATE: 85 BPM
EKG P AXIS: 83 DEGREES
EKG P-R INTERVAL: 192 MS
EKG Q-T INTERVAL: 438 MS
EKG QRS DURATION: 160 MS
EKG QTC CALCULATION (BAZETT): 521 MS
EKG R AXIS: -67 DEGREES
EKG T AXIS: 124 DEGREES
EKG VENTRICULAR RATE: 85 BPM
